# Patient Record
Sex: MALE | Race: WHITE | URBAN - METROPOLITAN AREA
[De-identification: names, ages, dates, MRNs, and addresses within clinical notes are randomized per-mention and may not be internally consistent; named-entity substitution may affect disease eponyms.]

---

## 2018-11-28 ENCOUNTER — APPOINTMENT (OUTPATIENT)
Dept: GENERAL RADIOLOGY | Facility: CLINIC | Age: 53
End: 2018-11-28
Attending: EMERGENCY MEDICINE
Payer: COMMERCIAL

## 2018-11-28 ENCOUNTER — HOSPITAL ENCOUNTER (EMERGENCY)
Facility: CLINIC | Age: 53
Discharge: HOME | End: 2018-11-28
Attending: EMERGENCY MEDICINE | Admitting: EMERGENCY MEDICINE
Payer: COMMERCIAL

## 2018-11-28 VITALS
TEMPERATURE: 98.6 F | DIASTOLIC BLOOD PRESSURE: 98 MMHG | SYSTOLIC BLOOD PRESSURE: 155 MMHG | HEART RATE: 80 BPM | RESPIRATION RATE: 16 BRPM | OXYGEN SATURATION: 96 %

## 2018-11-28 DIAGNOSIS — R74.01 TRANSAMINITIS: ICD-10-CM

## 2018-11-28 DIAGNOSIS — I10 HYPERTENSION, UNSPECIFIED TYPE: ICD-10-CM

## 2018-11-28 DIAGNOSIS — R07.9 CHEST PAIN, UNSPECIFIED TYPE: ICD-10-CM

## 2018-11-28 LAB
ALBUMIN SERPL-MCNC: 3.6 G/DL (ref 3.4–5)
ALP SERPL-CCNC: 68 U/L (ref 40–150)
ALT SERPL W P-5'-P-CCNC: 103 U/L (ref 0–70)
ANION GAP SERPL CALCULATED.3IONS-SCNC: 4 MMOL/L (ref 3–14)
AST SERPL W P-5'-P-CCNC: 82 U/L (ref 0–45)
BASOPHILS # BLD AUTO: 0.1 10E9/L (ref 0–0.2)
BASOPHILS NFR BLD AUTO: 0.9 %
BILIRUB SERPL-MCNC: 0.5 MG/DL (ref 0.2–1.3)
BUN SERPL-MCNC: 19 MG/DL (ref 7–30)
CALCIUM SERPL-MCNC: 8.5 MG/DL (ref 8.5–10.1)
CHLORIDE SERPL-SCNC: 102 MMOL/L (ref 94–109)
CO2 SERPL-SCNC: 29 MMOL/L (ref 20–32)
CREAT SERPL-MCNC: 0.91 MG/DL (ref 0.66–1.25)
DIFFERENTIAL METHOD BLD: ABNORMAL
EOSINOPHIL # BLD AUTO: 0.2 10E9/L (ref 0–0.7)
EOSINOPHIL NFR BLD AUTO: 3 %
ERYTHROCYTE [DISTWIDTH] IN BLOOD BY AUTOMATED COUNT: 13 % (ref 10–15)
GFR SERPL CREATININE-BSD FRML MDRD: 86 ML/MIN/1.7M2
GLUCOSE SERPL-MCNC: 101 MG/DL (ref 70–99)
HCT VFR BLD AUTO: 40.4 % (ref 40–53)
HGB BLD-MCNC: 13.7 G/DL (ref 13.3–17.7)
IMM GRANULOCYTES # BLD: 0 10E9/L (ref 0–0.4)
IMM GRANULOCYTES NFR BLD: 0.2 %
INTERPRETATION ECG - MUSE: NORMAL
LIPASE SERPL-CCNC: 148 U/L (ref 73–393)
LYMPHOCYTES # BLD AUTO: 2.3 10E9/L (ref 0.8–5.3)
LYMPHOCYTES NFR BLD AUTO: 42.9 %
MCH RBC QN AUTO: 31.6 PG (ref 26.5–33)
MCHC RBC AUTO-ENTMCNC: 33.9 G/DL (ref 31.5–36.5)
MCV RBC AUTO: 93 FL (ref 78–100)
MONOCYTES # BLD AUTO: 0.6 10E9/L (ref 0–1.3)
MONOCYTES NFR BLD AUTO: 10.2 %
NEUTROPHILS # BLD AUTO: 2.3 10E9/L (ref 1.6–8.3)
NEUTROPHILS NFR BLD AUTO: 42.8 %
NRBC # BLD AUTO: 0 10*3/UL
NRBC BLD AUTO-RTO: 0 /100
PLATELET # BLD AUTO: 295 10E9/L (ref 150–450)
POTASSIUM SERPL-SCNC: 4.2 MMOL/L (ref 3.4–5.3)
PROT SERPL-MCNC: 7.2 G/DL (ref 6.8–8.8)
RBC # BLD AUTO: 4.34 10E12/L (ref 4.4–5.9)
SODIUM SERPL-SCNC: 135 MMOL/L (ref 133–144)
TROPONIN I SERPL-MCNC: <0.015 UG/L (ref 0–0.04)
WBC # BLD AUTO: 5.4 10E9/L (ref 4–11)

## 2018-11-28 PROCEDURE — 84484 ASSAY OF TROPONIN QUANT: CPT | Performed by: EMERGENCY MEDICINE

## 2018-11-28 PROCEDURE — 71046 X-RAY EXAM CHEST 2 VIEWS: CPT

## 2018-11-28 PROCEDURE — 25000128 H RX IP 250 OP 636: Performed by: EMERGENCY MEDICINE

## 2018-11-28 PROCEDURE — 80053 COMPREHEN METABOLIC PANEL: CPT | Performed by: EMERGENCY MEDICINE

## 2018-11-28 PROCEDURE — 93005 ELECTROCARDIOGRAM TRACING: CPT

## 2018-11-28 PROCEDURE — 25000132 ZZH RX MED GY IP 250 OP 250 PS 637: Performed by: EMERGENCY MEDICINE

## 2018-11-28 PROCEDURE — 85025 COMPLETE CBC W/AUTO DIFF WBC: CPT | Performed by: EMERGENCY MEDICINE

## 2018-11-28 PROCEDURE — 99285 EMERGENCY DEPT VISIT HI MDM: CPT | Mod: 25

## 2018-11-28 PROCEDURE — 83690 ASSAY OF LIPASE: CPT | Performed by: EMERGENCY MEDICINE

## 2018-11-28 PROCEDURE — 96374 THER/PROPH/DIAG INJ IV PUSH: CPT

## 2018-11-28 RX ORDER — ASPIRIN 81 MG/1
324 TABLET, CHEWABLE ORAL ONCE
Status: COMPLETED | OUTPATIENT
Start: 2018-11-28 | End: 2018-11-28

## 2018-11-28 RX ORDER — KETOROLAC TROMETHAMINE 15 MG/ML
15 INJECTION, SOLUTION INTRAMUSCULAR; INTRAVENOUS ONCE
Status: COMPLETED | OUTPATIENT
Start: 2018-11-28 | End: 2018-11-28

## 2018-11-28 RX ADMIN — KETOROLAC TROMETHAMINE 15 MG: 15 INJECTION, SOLUTION INTRAMUSCULAR; INTRAVENOUS at 08:27

## 2018-11-28 RX ADMIN — ASPIRIN 81 MG 324 MG: 81 TABLET ORAL at 07:47

## 2018-11-28 ASSESSMENT — ENCOUNTER SYMPTOMS
SHORTNESS OF BREATH: 0
VOMITING: 0
LIGHT-HEADEDNESS: 0
DIAPHORESIS: 0
NAUSEA: 0
MYALGIAS: 0

## 2018-11-28 NOTE — ED AVS SNAPSHOT
[facility]    [address]    [facility] [address]    Phone:  [phone]    Fax:  [phone]                                       Gaurav Willson   MRN: [id number]    Department:  [facility] Emergency Department   Date of Visit:  11/28/2018           Patient Information     Date Of Birth          1965        Your diagnoses for this visit were:     Chest pain, unspecified type     Hypertension, unspecified type     Transaminitis        You were seen by Ping Stephenson DO.      Follow-up Information     Schedule an appointment as soon as possible for a visit in 1 day to follow up.    Contact information:    [facility] PCP        Discharge Instructions         Uncertain Causes of Chest Pain    Chest pain can happen for a number of reasons. Sometimes the cause can't be determined. If your condition does not seem serious, and your pain does not appear to be coming from your heart, your healthcare provider may recommend watching it closely. Sometimes the signs of a serious problem take more time to appear. Many problems not related to your heart can cause chest pain. These include:    Musculoskeletal. Costochondritis is an inflammation of the tissues around the ribs that can occur from trauma or overuse injuries, or a strain of the muscles of the chest wall    Respiratory. Pneumonia, collapsed lung (pneumothorax), or inflammation of the lining of the chest and lungs (pleurisy)    Gastrointestinal. Esophageal reflux, heartburn, ulcers, or gallbladder disease    Anxiety and panic disorders    Nerve compression and inflammation    Rare miscellaneous problems such as aortic aneurysm (a swelling of the large artery coming out of the heart) or pulmonary embolism (a blood clot in the lungs)  Home care  After your visit, follow these recommendations:    Rest today and avoid strenuous activity.    Take any prescribed medicine as directed.    Be aware of any  recurrent chest pain and notice any changes  Follow-up care  Follow up with your healthcare provider if you do not start to feel better within 24 hours, or as advised.  Call 911  Call 911 if any of these occur:    A change in the type of pain: if it feels different, becomes more severe, lasts longer, or begins to spread into your shoulder, arm, neck, jaw or back    Shortness of breath or increased pain with breathing    Weakness, dizziness, or fainting    Rapid heart beat    Crushing sensation in your chest   When to seek medical advice  Call your healthcare provider right away if any of the following occur:    Cough with dark colored sputum (phlegm) or blood    Fever of 100.4 F (38 C) or higher, or as directed by your healthcare provider    Swelling, pain or redness in one leg  Date Last Reviewed: 5/1/2018 2000-2018 [employer]. [address] [place] [address]. All rights reserved. This information is not intended as a substitute for professional medical care. Always follow your healthcare professional's instructions.          24 Hour Appointment Hotline       To make an appointment at any [facility], call [phone] ([phone]). If you don't have a family doctor or clinic, we will help you find one. [facility] are conveniently located to serve the needs of you and your family.          ED Discharge Orders     Exercise Stress Echocardiogram       Administration of IV contrast will be tailored to this examination per the appropriate written protocol listed in the Echocardiography department Protocol Book, or by the supervising Cardiologist. This may result in an order change.    Use of contrast is at the discretion of the supervising Cardiologist.                     Review of your medicines      Notice     You have not been prescribed any medications.            Procedures and tests performed during your visit     CBC with platelets differential    Cardiac Continuous  Monitoring    Comprehensive metabolic panel    EKG 12 lead    Lipase    Troponin I    XR Chest 2 Views      Orders Needing Specimen Collection     None      Pending Results     No orders found from 11/26/2018 to 11/29/2018.            Pending Culture Results     No orders found from 11/26/2018 to 11/29/2018.            Pending Results Instructions     If you had any lab results that were not finalized at the time of your Discharge, you can call the ED Lab Result RN at [phone]. You will be contacted by this team for any positive Lab results or changes in treatment. The nurses are available 7 days a week from 10A to 6:30P.  You can leave a message 24 hours per day and they will return your call.        Test Results From Your Hospital Stay        11/28/2018  7:56 AM      Component Results     Component Value Ref Range & Units Status    WBC 5.4 4.0 - 11.0 10e9/L Final    RBC Count 4.34 (L) 4.4 - 5.9 10e12/L Final    Hemoglobin 13.7 13.3 - 17.7 g/dL Final    Hematocrit 40.4 40.0 - 53.0 % Final    MCV 93 78 - 100 fl Final    MCH 31.6 26.5 - 33.0 pg Final    MCHC 33.9 31.5 - 36.5 g/dL Final    RDW 13.0 10.0 - 15.0 % Final    Platelet Count 295 150 - 450 10e9/L Final    Diff Method Automated Method  Final    % Neutrophils 42.8 % Final    % Lymphocytes 42.9 % Final    % Monocytes 10.2 % Final    % Eosinophils 3.0 % Final    % Basophils 0.9 % Final    % Immature Granulocytes 0.2 % Final    Nucleated RBCs 0 0 /100 Final    Absolute Neutrophil 2.3 1.6 - 8.3 10e9/L Final    Absolute Lymphocytes 2.3 0.8 - 5.3 10e9/L Final    Absolute Monocytes 0.6 0.0 - 1.3 10e9/L Final    Absolute Eosinophils 0.2 0.0 - 0.7 10e9/L Final    Absolute Basophils 0.1 0.0 - 0.2 10e9/L Final    Abs Immature Granulocytes 0.0 0 - 0.4 10e9/L Final    Absolute Nucleated RBC 0.0  Final         11/28/2018  8:21 AM      Component Results     Component Value Ref Range & Units Status    Sodium 135 133 - 144 mmol/L Final    Potassium 4.2 3.4 - 5.3 mmol/L  Final    Specimen slightly hemolyzed, potassium may be falsely elevated    Chloride 102 94 - 109 mmol/L Final    Carbon Dioxide 29 20 - 32 mmol/L Final    Anion Gap 4 3 - 14 mmol/L Final    Glucose 101 (H) 70 - 99 mg/dL Final    Urea Nitrogen 19 7 - 30 mg/dL Final    Creatinine 0.91 0.66 - 1.25 mg/dL Final    GFR Estimate 86 >60 mL/min/1.7m2 Final    Non  GFR Calc    GFR Estimate If Black >90 >60 mL/min/1.7m2 Final    African American GFR Calc    Calcium 8.5 8.5 - 10.1 mg/dL Final    Bilirubin Total 0.5 0.2 - 1.3 mg/dL Final    Albumin 3.6 3.4 - 5.0 g/dL Final    Protein Total 7.2 6.8 - 8.8 g/dL Final    Alkaline Phosphatase 68 40 - 150 U/L Final     (H) 0 - 70 U/L Final    AST 82 (H) 0 - 45 U/L Final         11/28/2018  8:21 AM      Component Results     Component Value Ref Range & Units Status    Lipase 148 73 - 393 U/L Final         11/28/2018  8:27 AM      Narrative     XR CHEST 2 VW 11/28/2018 8:16 AM    HISTORY: Chest pain.    COMPARISON: None.    FINDINGS: No airspace consolidation, pleural effusion or pneumothorax.  Normal heart size.        Impression     IMPRESSION: No acute cardiopulmonary abnormality.    JULIETA TOBIAS MD         11/28/2018  8:21 AM      Component Results     Component Value Ref Range & Units Status    Troponin I ES <0.015 0.000 - 0.045 ug/L Final    The 99th percentile for upper reference range is 0.045 ug/L.  Troponin values   in the range of 0.045 - 0.120 ug/L may be associated with risks of adverse   clinical events.                  Clinical Quality Measure: Blood Pressure Screening     Your blood pressure was checked while you were in the emergency department today. The last reading we obtained was  BP: (!) 155/98 . Please read the guidelines below about what these numbers mean and what you should do about them.  If your systolic blood pressure (the top number) is less than 120 and your diastolic blood pressure (the bottom number) is less than 80, then your  "blood pressure is normal. There is nothing more that you need to do about it.  If your systolic blood pressure (the top number) is 120-139 or your diastolic blood pressure (the bottom number) is 80-89, your blood pressure may be higher than it should be. You should have your blood pressure rechecked within a year by a primary care provider.  If your systolic blood pressure (the top number) is 140 or greater or your diastolic blood pressure (the bottom number) is 90 or greater, you may have high blood pressure. High blood pressure is treatable, but if left untreated over time it can put you at risk for heart attack, stroke, or kidney failure. You should have your blood pressure rechecked by a primary care provider within the next 4 weeks.  If your provider in the emergency department today gave you specific instructions to follow-up with your doctor or provider even sooner than that, you should follow that instruction and not wait for up to 4 weeks for your follow-up visit.        Thank you for choosing [facility]       Thank you for choosing [facility] for your care. Our goal is always to provide you with excellent care. Hearing back from our patients is one way we can continue to improve our services. Please take a few minutes to complete the written survey that you may receive in the mail after you visit with us. Thank you!        [employer] Information     [employer] lets you send messages to your doctor, view your test results, renew your prescriptions, schedule appointments and more. To sign up, go to [email or web address] . Click on \"Log in\" on the left side of the screen, which will take you to the Welcome page. Then click on \"Sign up Now\" on the right side of the page.     You will be asked to enter the access code listed below, as well as some personal information. Please follow the directions to create your username and password.     Your access code is: [email or web address]  Expires: 2/26/2019  8:40 AM     Your access " code will  in 90 days. If you need help or a new code, please call your [facility] clinic or [phone].        Care EveryWhere ID     This is your Care EveryWhere ID. This could be used by other organizations to access your [facility] medical records  [id number]        Equal Access to Services     JYOTHI GAINES : Ramiro maharajo Soabram, waaxda luqadaha, qaybta kaalmada jonathan, richard lynn. So [facility] [phone].    ATENCIÓN: Si habla español, tiene a alvares disposición servicios gratuitos de asistencia lingüística. Llame al [phone].    We comply with applicable federal civil rights laws and [place] laws. We do not discriminate on the basis of race, color, national origin, age, disability, sex, sexual orientation, or gender identity.            After Visit Summary       This is your record. Keep this with you and show to your community pharmacist(s) and doctor(s) at your next visit.

## 2018-11-28 NOTE — DISCHARGE INSTRUCTIONS
Uncertain Causes of Chest Pain    Chest pain can happen for a number of reasons. Sometimes the cause can't be determined. If your condition does not seem serious, and your pain does not appear to be coming from your heart, your healthcare provider may recommend watching it closely. Sometimes the signs of a serious problem take more time to appear. Many problems not related to your heart can cause chest pain. These include:    Musculoskeletal. Costochondritis is an inflammation of the tissues around the ribs that can occur from trauma or overuse injuries, or a strain of the muscles of the chest wall    Respiratory. Pneumonia, collapsed lung (pneumothorax), or inflammation of the lining of the chest and lungs (pleurisy)    Gastrointestinal. Esophageal reflux, heartburn, ulcers, or gallbladder disease    Anxiety and panic disorders    Nerve compression and inflammation    Rare miscellaneous problems such as aortic aneurysm (a swelling of the large artery coming out of the heart) or pulmonary embolism (a blood clot in the lungs)  Home care  After your visit, follow these recommendations:    Rest today and avoid strenuous activity.    Take any prescribed medicine as directed.    Be aware of any recurrent chest pain and notice any changes  Follow-up care  Follow up with your healthcare provider if you do not start to feel better within 24 hours, or as advised.  Call 911  Call 911 if any of these occur:    A change in the type of pain: if it feels different, becomes more severe, lasts longer, or begins to spread into your shoulder, arm, neck, jaw or back    Shortness of breath or increased pain with breathing    Weakness, dizziness, or fainting    Rapid heart beat    Crushing sensation in your chest   When to seek medical advice  Call your healthcare provider right away if any of the following occur:    Cough with dark colored sputum (phlegm) or blood    Fever of 100.4 F (38 C) or higher, or as directed by your  healthcare provider    Swelling, pain or redness in one leg  Date Last Reviewed: 5/1/2018 2000-2018 [employer]. [address] [place] [address], [place] [address]. All rights reserved. This information is not intended as a substitute for professional medical care. Always follow your healthcare professional's instructions.

## 2018-11-28 NOTE — ED AVS SNAPSHOT
[facility] Emergency Department    [address]    [facility] [address]    Phone:  [phone]    Fax:  [phone]                                       Gaurav Willson   MRN: [id number]    Department:  [facility] Emergency Department   Date of Visit:  11/28/2018           After Visit Summary Signature Page     I have received my discharge instructions, and my questions have been answered. I have discussed any challenges I see with this plan with the nurse or doctor.    ..........................................................................................................................................  Patient/Patient Representative Signature      ..........................................................................................................................................  Patient Representative Print Name and Relationship to Patient    ..................................................               ................................................  Date                                   Time    ..........................................................................................................................................  Reviewed by Signature/Title    ...................................................              ..............................................  Date                                               Time          22EPIC Rev 08/18

## 2018-11-28 NOTE — ED TRIAGE NOTES
"Left chest pain, worse with movement. Started yesterday, took ibuprofen which \"made it less, but still there\".   "

## 2018-11-28 NOTE — LETTER
November 28, 2018      To Whom It May Concern:      Gaurav Willson was seen in our Emergency Department today, 11/28/18.  I expect his condition to improve over the next 1-2 days.  He may return to work/school when improved.    Sincerely,        Rima Hernández RN

## 2018-11-28 NOTE — ED PROVIDER NOTES
History     Chief Complaint:  Chest Pain    HPI   Gaurav Willson is a 53 year old male with a history of HTN, HLD who presents to the emergency department for evaluation of chest pain. The patient reports he had sudden onset of constant, nonradiating, sharp, upper left-sided chest pain (rated 5/10) yesterday around 1200, after leaving his house and closing the door, worsened by movement. He denies any nausea, vomiting, diaphoresis, shortness of breath, or lightheadedness accompanying the pain; he was able to workout this morning. The patient has not had a stress test in the past. The patient denies any history of DM or MI. The patient took ibuprofen today with minimal improvement of symptoms. He notes he has been drinking 6 shots of alcohol daily, but no history of alcohol withdrawal; he denies any recreational drug use.    Allergies:  NKDA     Medications:    Simvastatin  Blood pressure medication, unknown    Past Medical History:    HTN  HLD  Specifically denies DM or MI    Past Surgical History:    The patient does not have any pertinent past surgical history.    Family History:    No past pertinent family history.    Social History:  Presents alone.  Marital Status:       Review of Systems   Constitutional: Negative for diaphoresis.   Respiratory: Negative for shortness of breath.    Cardiovascular: Positive for chest pain.   Gastrointestinal: Negative for nausea and vomiting.   Musculoskeletal: Negative for myalgias.   Neurological: Negative for light-headedness.   All other systems reviewed and are negative.    Physical Exam     Patient Vitals for the past 24 hrs:   BP Temp Temp src Pulse Heart Rate Resp SpO2   11/28/18 0837 (!) 155/98 - - - - - -   11/28/18 0758 (!) 151/105 - - - - - -   11/28/18 0751 (!) 151/105 - - - 80 - 96 %   11/28/18 0727 (!) 163/113 98.6  F (37  C) Oral 80 80 16 98 %       Physical Exam  Nursing note and vitals reviewed.  Constitutional: Well nourished. Resting comfortably.    Eyes: Conjunctiva normal.  Pupils are equal, round, and reactive to light.   ENT: Nose normal. Mucous membranes pink and moist.    Neck: Normal range of motion.  CVS: Normal rate, regular rhythm.  Normal heart sounds.  No murmur.  Pulmonary: Lungs clear to auscultation bilaterally. No wheezes/rales/rhonchi.  GI: Abdomen soft. Nontender, nondistended. No rigidity or guarding.    MSK: No calf tenderness or swelling.  Neuro: Alert. Follows simple commands.  Skin: Skin is warm and dry. No rash noted.   Psychiatric: Normal affect.       Emergency Department Course   ECG:  Time: 0727  Vent. Rate 76 bpm. RI interval 148. QRS duration 84. QT/QTc 376/423. P-R-T axis 46 14 34. Normal sinus rhythm. Normal ECG. Read time: 0727    Imaging:  Radiographic findings were communicated with the patient who voiced understanding of the findings.    XR Chest 2 views:   No acute cardiopulmonary abnormality. As per radiology.     Laboratory:  CBC: WBC: 5.4, HGB: 13.7, PLT: 295  CMP: Glucose 101 (H),  (H), AST 82 (H), o/w WNL (Creatinine: 0.91)    Lipase: 148    0744 Troponin I: <0.015    Interventions:  0747 Aspirin 324 mg PO  0827 Toradol 15 mg IV    Emergency Department Course:  Nursing notes and vitals reviewed. 0726 I performed an exam of the patient as documented above.     EKG obtained in the ED, see results above.     IV inserted. Medicine administered as documented above. Blood drawn. This was sent to the lab for further testing, results above.    0833 I rechecked the patient and discussed the results of his workup thus far. The patient reports feeling improved following treatment at the ED.  Repeat BP improved.     Findings and plan explained to the Patient. Patient discharged home with instructions regarding supportive care, medications, and reasons to return. The importance of close follow-up was reviewed.     I personally reviewed the laboratory results with the Patient and answered all related questions prior to  discharge.     Impression & Plan      Medical Decision Making:  HEART Score:    Predicts Major Adverse Cardiac Events within 6 weeks:    History:   Highly suspicious:  2   Moderately suspicious: 1   Slightly/Non-suspicious: 0  ECG:   Significant ST depression 2   Nonspecific repolarization 1   Normal    0  Age:    >=65    2   >45-<65   1   <= 45    0  Risk Factors [DM, Current or recent smoker, HTN, HLP, FMH CAD, Obesity]   >=3 or Hx. CAD  2   1-2    1   None    0  Troponin:   >= 3x normal   2   >1 to <3x normal  1   Normal    0    This Patient's Score:   3    Interpretation:    0-3:    2.5% risk of MACE (may consider D/C)   4-6:    20.3% (Observation)   7-10:    72.7% (Admit, consider early invasive strategy)      Gaurav Willson is a 53 year old male who presented to the Emergency Department with chest pain for greater than 8 hours. Initial laboratory and imaging tests have come back normal after greater than eight hours of constant pain without progression of symptoms or other new concerning symptoms.  There is no clinical, laboratory, or radiographic evidence of pulmonary embolism, aortic dissection, pneumonia, pneumothorax or cardiac ischemia.  Other etiologies of chest pain include but are not limited to chostochondritis, esophageal spasm or GI source, pleuritis, referred pain, anxiety.  I have no suspicion of unstable angina at this point.  I feel the patient is low risk clinically and is low risk per the HEART score.  I believe the patient is safe for outpatient stress testing.  I will order an outpatient stress echo within 72 hours with plans for close PCP follow-up in the next few days.  Of note patient hypertensive during time in ED, he stated he did not take his BP med today.  Counseled on improtance of good compliance with meds.  Also mildly elevated transaminitis consistent with ETOH use. The patient agrees with the plan and all questions were answered.    Diagnosis:    ICD-10-CM   1. Chest pain,  unspecified type R07.9   2. Hypertension, unspecified type I10   3. Transaminitis R74.0       Disposition:  discharged to home    I, Markos Castle, am serving as a scribe on 11/28/2018 at 7:19 AM to personally document services performed by Ping Stephenson DO based on my observations and the provider's statements to me.     Markos Castle  11/28/2018   [facility] EMERGENCY DEPARTMENT       Ping Stephenson DO  11/28/18 0843

## 2020-09-21 ENCOUNTER — HOSPITAL ENCOUNTER (EMERGENCY)
Facility: CLINIC | Age: 55
Discharge: LEFT BEFORE DISCHARGE | End: 2020-09-21
Admitting: EMERGENCY MEDICINE
Payer: COMMERCIAL

## 2020-09-21 VITALS
TEMPERATURE: 98.2 F | HEART RATE: 93 BPM | HEIGHT: 71 IN | RESPIRATION RATE: 16 BRPM | SYSTOLIC BLOOD PRESSURE: 128 MMHG | DIASTOLIC BLOOD PRESSURE: 84 MMHG | OXYGEN SATURATION: 97 %

## 2020-09-21 LAB — INTERPRETATION ECG - MUSE: NORMAL

## 2020-09-21 PROCEDURE — 93005 ELECTROCARDIOGRAM TRACING: CPT

## 2020-09-21 PROCEDURE — 40000268 ZZH STATISTIC NO CHARGES

## 2020-09-21 NOTE — ED TRIAGE NOTES
"Was at work and didn't feel well then woke up on floor. States \"I think I drank too much alcohol yesterday and not enough water\".  "